# Patient Record
Sex: FEMALE | Race: WHITE | Employment: OTHER | URBAN - METROPOLITAN AREA
[De-identification: names, ages, dates, MRNs, and addresses within clinical notes are randomized per-mention and may not be internally consistent; named-entity substitution may affect disease eponyms.]

---

## 2018-04-17 ENCOUNTER — OFFICE VISIT (OUTPATIENT)
Dept: URGENT CARE | Facility: CLINIC | Age: 83
End: 2018-04-17
Payer: MEDICARE

## 2018-04-17 ENCOUNTER — APPOINTMENT (OUTPATIENT)
Dept: RADIOLOGY | Facility: CLINIC | Age: 83
End: 2018-04-17
Payer: MEDICARE

## 2018-04-17 VITALS
DIASTOLIC BLOOD PRESSURE: 86 MMHG | RESPIRATION RATE: 20 BRPM | HEART RATE: 80 BPM | TEMPERATURE: 97.9 F | OXYGEN SATURATION: 98 % | BODY MASS INDEX: 31.49 KG/M2 | WEIGHT: 150 LBS | SYSTOLIC BLOOD PRESSURE: 190 MMHG | HEIGHT: 58 IN

## 2018-04-17 DIAGNOSIS — M54.41 ACUTE LOW BACK PAIN WITH RIGHT-SIDED SCIATICA, UNSPECIFIED BACK PAIN LATERALITY: Primary | ICD-10-CM

## 2018-04-17 DIAGNOSIS — M54.41 ACUTE LOW BACK PAIN WITH RIGHT-SIDED SCIATICA, UNSPECIFIED BACK PAIN LATERALITY: ICD-10-CM

## 2018-04-17 PROCEDURE — G0463 HOSPITAL OUTPT CLINIC VISIT: HCPCS

## 2018-04-17 PROCEDURE — 72100 X-RAY EXAM L-S SPINE 2/3 VWS: CPT

## 2018-04-17 PROCEDURE — 99203 OFFICE O/P NEW LOW 30 MIN: CPT

## 2018-04-17 PROCEDURE — 73502 X-RAY EXAM HIP UNI 2-3 VIEWS: CPT

## 2018-04-17 RX ORDER — SIMVASTATIN 20 MG
20 TABLET ORAL
COMMUNITY

## 2018-04-17 RX ORDER — ISOSORBIDE DINITRATE 30 MG/1
30 TABLET ORAL 4 TIMES DAILY
COMMUNITY

## 2018-04-17 RX ORDER — LEVOTHYROXINE SODIUM 0.05 MG/1
50 TABLET ORAL DAILY
COMMUNITY

## 2018-04-17 RX ORDER — ASPIRIN 81 MG/1
81 TABLET, CHEWABLE ORAL DAILY
COMMUNITY

## 2018-04-17 RX ORDER — PANTOPRAZOLE SODIUM 40 MG/1
40 TABLET, DELAYED RELEASE ORAL DAILY
COMMUNITY

## 2018-04-17 RX ORDER — AMLODIPINE BESYLATE AND ATORVASTATIN CALCIUM 5; 40 MG/1; MG/1
1 TABLET, FILM COATED ORAL DAILY
COMMUNITY

## 2018-04-17 RX ORDER — PREDNISONE 20 MG/1
40 TABLET ORAL DAILY
Qty: 10 TABLET | Refills: 0 | Status: SHIPPED | OUTPATIENT
Start: 2018-04-17 | End: 2018-04-22
